# Patient Record
Sex: FEMALE
[De-identification: names, ages, dates, MRNs, and addresses within clinical notes are randomized per-mention and may not be internally consistent; named-entity substitution may affect disease eponyms.]

---

## 2020-06-03 PROBLEM — Z00.00 ENCOUNTER FOR PREVENTIVE HEALTH EXAMINATION: Status: ACTIVE | Noted: 2020-06-03

## 2020-06-08 ENCOUNTER — APPOINTMENT (OUTPATIENT)
Dept: HEART AND VASCULAR | Facility: CLINIC | Age: 45
End: 2020-06-08
Payer: MEDICAID

## 2020-06-08 ENCOUNTER — NON-APPOINTMENT (OUTPATIENT)
Age: 45
End: 2020-06-08

## 2020-06-08 VITALS
WEIGHT: 133 LBS | DIASTOLIC BLOOD PRESSURE: 80 MMHG | SYSTOLIC BLOOD PRESSURE: 120 MMHG | HEIGHT: 63 IN | BODY MASS INDEX: 23.57 KG/M2 | HEART RATE: 70 BPM | RESPIRATION RATE: 12 BRPM

## 2020-06-08 DIAGNOSIS — Z78.9 OTHER SPECIFIED HEALTH STATUS: ICD-10-CM

## 2020-06-08 DIAGNOSIS — R01.1 CARDIAC MURMUR, UNSPECIFIED: ICD-10-CM

## 2020-06-08 DIAGNOSIS — R00.2 PALPITATIONS: ICD-10-CM

## 2020-06-08 PROCEDURE — 93000 ELECTROCARDIOGRAM COMPLETE: CPT

## 2020-06-08 PROCEDURE — 99204 OFFICE O/P NEW MOD 45 MIN: CPT

## 2020-06-08 PROCEDURE — 93306 TTE W/DOPPLER COMPLETE: CPT

## 2020-06-08 NOTE — DISCUSSION/SUMMARY
[FreeTextEntry1] : 1. Palpitations: Advised to get copy of recent bloodwork for review when available. EKG. At this point symptoms are not consistent and if they become persistent and more frequent we'll place a 24-hour Holter monitor.\par 2. Cardiac murmur: Echocardiogram

## 2020-06-08 NOTE — HISTORY OF PRESENT ILLNESS
[FreeTextEntry1] : 44-year-old female with no significant past medical history comes in for evaluation of palpitations. Patient reports 2 weeks ago had nowhere developed palpitations that lasted around one minute associated with lightheadedness but no syncope. Since then has had episodes of "skipped heartbeats". No associated chest pain shortness of breath PND or orthopnea.patient underwent blood work by PCP currently awaiting results.

## 2020-06-08 NOTE — PHYSICAL EXAM
[General Appearance - Well Developed] : well developed [Well Groomed] : well groomed [Normal Appearance] : normal appearance [General Appearance - Well Nourished] : well nourished [No Deformities] : no deformities [General Appearance - In No Acute Distress] : no acute distress [Normal Oral Mucosa] : normal oral mucosa [No Oral Cyanosis] : no oral cyanosis [No Oral Pallor] : no oral pallor [Normal Jugular Venous V Waves Present] : normal jugular venous V waves present [Normal Jugular Venous A Waves Present] : normal jugular venous A waves present [No Jugular Venous Lloyd A Waves] : no jugular venous lloyd A waves [5th Left ICS - MCL] : palpated at the 5th LICS in the midclavicular line [Normal] : normal [Normal Rate] : normal [Rhythm Regular] : regular [Normal S1] : normal S1 [Normal S2] : normal S2 [Right Carotid Bruit] : no bruit heard over the right carotid [III] : a grade 3 [Left Carotid Bruit] : no bruit heard over the left carotid [No Pitting Edema] : no pitting edema present [Respiration, Rhythm And Depth] : normal respiratory rhythm and effort [Exaggerated Use Of Accessory Muscles For Inspiration] : no accessory muscle use [Auscultation Breath Sounds / Voice Sounds] : lungs were clear to auscultation bilaterally [Abdomen Soft] : soft [Abdomen Mass (___ Cm)] : no abdominal mass palpated [Abdomen Tenderness] : non-tender [Abnormal Walk] : normal gait [Gait - Sufficient For Exercise Testing] : the gait was sufficient for exercise testing [Nail Clubbing] : no clubbing of the fingernails [Cyanosis, Localized] : no localized cyanosis [Oriented To Time, Place, And Person] : oriented to person, place, and time [Petechial Hemorrhages (___cm)] : no petechial hemorrhages [] : no ischemic changes [Affect] : the affect was normal [Mood] : the mood was normal [No Anxiety] : not feeling anxious

## 2020-06-16 ENCOUNTER — APPOINTMENT (OUTPATIENT)
Dept: HEART AND VASCULAR | Facility: CLINIC | Age: 45
End: 2020-06-16

## 2025-04-21 ENCOUNTER — APPOINTMENT (OUTPATIENT)
Dept: COLORECTAL SURGERY | Facility: CLINIC | Age: 50
End: 2025-04-21
Payer: SELF-PAY

## 2025-04-21 ENCOUNTER — NON-APPOINTMENT (OUTPATIENT)
Age: 50
End: 2025-04-21

## 2025-04-21 VITALS
HEIGHT: 63 IN | HEART RATE: 80 BPM | SYSTOLIC BLOOD PRESSURE: 118 MMHG | BODY MASS INDEX: 23.04 KG/M2 | WEIGHT: 130 LBS | DIASTOLIC BLOOD PRESSURE: 82 MMHG | TEMPERATURE: 97.7 F

## 2025-04-21 DIAGNOSIS — K60.2 ANAL FISSURE, UNSPECIFIED: ICD-10-CM

## 2025-04-21 PROCEDURE — 99202 OFFICE O/P NEW SF 15 MIN: CPT
